# Patient Record
(demographics unavailable — no encounter records)

---

## 2024-11-18 NOTE — ASSESSMENT
[FreeTextEntry1] : PCP: Brendan Pepper MD  Michelle Spaulding is a 76-year-old black female, CHAPIS negative, here today for follow up. 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 4/20/23 Surgical path: Left lumpectomy, SLNbx, IDC, Gr2, T1a (1.2mm), N0/8 Mx, DCIS, Gr3, no EIC, No LCIS, Margins neg for IDC >2mm and DCIS >2 mm. No LVI. Triple NEGATIVE on core. Pt s/p neoadj.  Doing well but seroma on left has reformed, was aspirated on 9/24 due to BR4a mmg on 8/24 and cytology was negative. It feels bulky and pt would like it reaspirated, discussed may reform.  Completed radiation 6/23 Pt finished chemo 4/16 per Dr. Rivera, finished Keytruda. Seeing Dr. Tolentino for breast edema and arm pain, completed PT has f.u with him in December.   History of R lumpectomy and ALND, chemotherapy and radiation in 1997 in [facility] at 48 y/o. Pt unsure of receptors.  10/13/22 [place], L US: *5:00 6cm fn 1.8cm oval-shaped hypoechoic mass with indistinct margins for which US guided bx is rec. *L axillary LN with thickened cortex for which US guided bx is rec. BR4c  11/1/22 [place], L breast and axillary lymph node bx. Path: *L breast 5:00 6cm fn, Gr3, ER 0%, PR0%, Her-2 neg-TRIPLE NEGATIVE, DCIS-Gr 2-3. No LVI. *L axillary LN yielded fragments of LN, negative for carcinoma. Rec surgical or oncological management. Results are Concordant.  11/18/2022 [place], MRI: R BREAST: R breast lumpectomy/posttreatment change. There is no susp enhancement in the R breast. LEFT BREAST: 15 x 20 x 10 mm (TR x AP x CC) mid posterior/N9.6 LOQ/4:00 LEFT breast mass (w/central biopsy tissue marker artifact, >2.5 cm anterior to chest wall and 2.5 cm deep to lateral skin surface) , with adjacent lateral post biopsy change/15 mm hematoma. Adjacent linear NME extends approx 2 cm anteriorly. No other suspicious LEFT breast enhancement. AXILLA/OTHER: There is no significant axillary or internal mammary lymphadenopathy. IMPRESSION: 15 x 20 x 10 mm (TR x AP x CC) mid posterior LOQ LEFT breast mass w/ NME extending approx 2 cm anteriorly. Wide excision recommended. REC: Surgical or oncologic management. BR6  03/23/2023 [place], MRI: R BREAST: Postlumpectomy changes. No suspicious findings. LEFT BREAST: Bx marker in inf with partial resolution of the previously noted adjacent enhancement. There is 2.4 cm linear NME anterior bx marker and 6 mm of NME posterior to the biopsy marker 2.5 cm from chest wall. AXILLA/OTHER: Left axillary lymph nodes with increased prominence compared to 11/18/2022. Right negative. Partially imaged right chest Mediport catheter tip is in the SVC. IMPRESSION: Partial resolution of NME adjacent to biopsy marker in the area of known malignancy in the left inferior breast, corresponding with partial response to neoadjuvant chemotherapy. Residual linear NME measures approximately 3 cm. L axillary LNs with increased prominence as compared to prior exam 11/18/2022. Rec further eval with targeted US and possible bx. REC: US biopsy. BR4B  10/9/23 [facility] dMMG and US: Extremely dense. No susp mass, microcals or other sign of malignancy is identified. There are stable postsurgical changes right breast. There is new infusion port which projects over the right axilla. There are new postsurgical changes posterior central/6:00 left breast. There is diffuse skin thickening left breast status post radiation therapy. There are new postsurgical changes left axilla. US: R- No suspicious solid mass. There is postsurgical scar site 12:00. L- No suspicious solid mass. There is diffuse left breast skin thickening. 12:00 RA 0.6 x 0.3 x 0.2 cm oval-shaped circumscribed hypoechoic mass stable. 5:00 6cmfn postsurgical scar site with large seroma measuring 3.7 x 1.3 x 5.4 cm. Impression: no mmg or US evidence of malignancy. Rec: mmg in 1yr. BR2  10/9/23 PET/CT: Interval metabolic reduction noted in previous left breast lesion and left axillary LNs. Interval development of diffuse skin thickening of this breast, likely inflammatory change. No recurrence in contralateral breast. No convincing evidence of robson or distant metastasis.  1/23/24 PET/CT: Postsurgical changes L breast without significant activity. Mild nonfocal activity along left breast skin thickening. Continue f/u is rec Pt is s/p R breast lumpectomy with no abnormal activity in remaining breast. Bilat axilla negative. Increase in size of density in upper pole of R kidney. Correlate with dedicated contrast CT or MRI for further characterization.  8/29/24 NFR, bilat dMMG and L limited US: Het dense. 4 x 5 cm circumscribed posterior retroareolar/lumpectomy bed mass with adjacent surgical clips, developed from 10/9/2023. Radiation treatment changes, unchanged. No suspicious microcalcifications, or other sign of malignancy is identified. L limited US: Circumscribed 5.6 x 3 x 5.4 cm complex seroma or avascular mass corresponds to mammographic/palpable abnormality in the 5:00 N6 LEFT breast. Impression: 5.6 x 3 x 5.4 cm LEFT lumpectomy bed complex seroma or avascular mass is developed from 10/9/2023. Rec: Cyst aspiration. If determined to be solid at time of aspiration, core biopsy can be performed. BR4A.   9/5/24 [place], L 5:00 6N cyst aspiration: Negative for malignant cells. Impression: benign and concordant. Rec clinical f/u.    FHx: Breast cancer: MAunt 1/2  CBE: Right Well healed prior axillary scar and UIQ scar. Left inframammary inc and Axillary inc healed well with post radiation and postop changes. Right Well healed prior axillary scar and UIQ scar.  Pt feels the seroma and aspiration discussed. Discussed may reform, she understands but would like aspiration. See procedure note.  Pt felt relief after aspiration.  F/u with Dr. Rivera and Dr. Tolentino as scheduled. Reviewed with patient her exercise regimen. She participated in the exercise programs, [email or web address].   PLAN: Pt is 1.5 years out. ARELI.  F.u with Dr. Rivera and Patric as scheduled. Cytology from today's seroma aspiration sent.  Left u/s and mmg, 2/25 and then f.u.

## 2024-11-18 NOTE — DATA REVIEWED
[FreeTextEntry1] : 8/29/24 NFR, bilat dMMG and L limited US: Het dense. 4 x 5 cm circumscribed posterior retroareolar/lumpectomy bed mass with adjacent surgical clips, developed from 10/9/2023. Radiation treatment changes, unchanged. No suspicious microcalcifications, or other sign of malignancy is identified. L limited US: Circumscribed 5.6 x 3 x 5.4 cm complex seroma or avascular mass corresponds to mammographic/palpable abnormality in the 5:00 N6 LEFT breast. Impression: 5.6 x 3 x 5.4 cm LEFT lumpectomy bed complex seroma or avascular mass is developed from 10/9/2023. Rec: Cyst aspiration. If determined to be solid at time of aspiration, core biopsy can be performed. BR4A.   9/5/24 [place], L 5:00 6N cyst aspiration: Negative for malignant cells. Impression: benign and concordant. Rec clinical f/u.

## 2024-11-18 NOTE — PHYSICAL EXAM
[Normocephalic] : normocephalic [Atraumatic] : atraumatic [Supple] : supple [No Supraclavicular Adenopathy] : no supraclavicular adenopathy [No Thyromegaly] : no thyromegaly [Examined in the supine and seated position] : examined in the supine and seated position [No dominant masses] : no dominant masses in right breast  [No dominant masses] : no dominant masses left breast [No Nipple Retraction] : no left nipple retraction [No Nipple Discharge] : no left nipple discharge [No Axillary Lymphadenopathy] : no left axillary lymphadenopathy [No Edema] : no edema [No Swelling] : no swelling [Full ROM] : full range of motion [No Rashes] : no rashes [No Ulceration] : no ulceration [de-identified] :  Right Well healed prior axillary scar and UIQ scar. Left inframammary inc and Axillary inc healed well with post radiation and postop changes. Right Well healed prior axillary scar and UIQ scar. [TextEntry] : Psych: Appropriate, NAD, A&Ox3 Neuro: Intact grossly, gait normal

## 2024-11-18 NOTE — HISTORY OF PRESENT ILLNESS
[FreeTextEntry1] : PCP: Brendan Pepper MD  Michelle Spaulding is a 76-year-old black female, CHAPIS negative, here today for follow up. 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 4/20/23 Surgical path: Left lumpectomy, SLNbx, IDC, Gr2, T1a (1.2mm), N0/8 Mx, DCIS, Gr3, no EIC, No LCIS, Margins neg for IDC >2mm and DCIS >2 mm. No LVI. Triple NEGATIVE on core. Pt s/p neoadj.  Doing well but seroma on left has reformed, was aspirated on 9/24 due to BR4a mmg on 8/24 and cytology was negative. It feels bulky and pt would like it reaspirated, discussed may reform.  Completed radiation 6/23 Pt finished chemo 4/16 per Dr. Rivear, finished Keytruda. Seeing Dr. Tolentino for breast edema and arm pain, completed PT has f.u with him in December.   History of R lumpectomy and ALND, chemotherapy and radiation in 1997 in [facility] at 48 y/o. Pt unsure of receptors.  10/13/22 [place], L US: *5:00 6cm fn 1.8cm oval-shaped hypoechoic mass with indistinct margins for which US guided bx is rec. *L axillary LN with thickened cortex for which US guided bx is rec. BR4c  11/1/22 [place], L breast and axillary lymph node bx. Path: *L breast 5:00 6cm fn, Gr3, ER 0%, PR0%, Her-2 neg-TRIPLE NEGATIVE, DCIS-Gr 2-3. No LVI. *L axillary LN yielded fragments of LN, negative for carcinoma. Rec surgical or oncological management. Results are Concordant.  11/18/2022 [place], MRI: R BREAST: R breast lumpectomy/posttreatment change. There is no susp enhancement in the R breast. LEFT BREAST: 15 x 20 x 10 mm (TR x AP x CC) mid posterior/N9.6 LOQ/4:00 LEFT breast mass (w/central biopsy tissue marker artifact, >2.5 cm anterior to chest wall and 2.5 cm deep to lateral skin surface) , with adjacent lateral post biopsy change/15 mm hematoma. Adjacent linear NME extends approx 2 cm anteriorly. No other suspicious LEFT breast enhancement. AXILLA/OTHER: There is no significant axillary or internal mammary lymphadenopathy. IMPRESSION: 15 x 20 x 10 mm (TR x AP x CC) mid posterior LOQ LEFT breast mass w/ NME extending approx 2 cm anteriorly. Wide excision recommended. REC: Surgical or oncologic management. BR6  03/23/2023 [place], MRI: R BREAST: Postlumpectomy changes. No suspicious findings. LEFT BREAST: Bx marker in inf with partial resolution of the previously noted adjacent enhancement. There is 2.4 cm linear NME anterior bx marker and 6 mm of NME posterior to the biopsy marker 2.5 cm from chest wall. AXILLA/OTHER: Left axillary lymph nodes with increased prominence compared to 11/18/2022. Right negative. Partially imaged right chest Mediport catheter tip is in the SVC. IMPRESSION: Partial resolution of NME adjacent to biopsy marker in the area of known malignancy in the left inferior breast, corresponding with partial response to neoadjuvant chemotherapy. Residual linear NME measures approximately 3 cm. L axillary LNs with increased prominence as compared to prior exam 11/18/2022. Rec further eval with targeted US and possible bx. REC: US biopsy. BR4B  10/9/23 [facility] dMMG and US: Extremely dense. No susp mass, microcals or other sign of malignancy is identified. There are stable postsurgical changes right breast. There is new infusion port which projects over the right axilla. There are new postsurgical changes posterior central/6:00 left breast. There is diffuse skin thickening left breast status post radiation therapy. There are new postsurgical changes left axilla. US: R- No suspicious solid mass. There is postsurgical scar site 12:00. L- No suspicious solid mass. There is diffuse left breast skin thickening. 12:00 RA 0.6 x 0.3 x 0.2 cm oval-shaped circumscribed hypoechoic mass stable. 5:00 6cmfn postsurgical scar site with large seroma measuring 3.7 x 1.3 x 5.4 cm. Impression: no mmg or US evidence of malignancy. Rec: mmg in 1yr. BR2  10/9/23 PET/CT: Interval metabolic reduction noted in previous left breast lesion and left axillary LNs. Interval development of diffuse skin thickening of this breast, likely inflammatory change. No recurrence in contralateral breast. No convincing evidence of robson or distant metastasis.  1/23/24 PET/CT: Postsurgical changes L breast without significant activity. Mild nonfocal activity along left breast skin thickening. Continue f/u is rec Pt is s/p R breast lumpectomy with no abnormal activity in remaining breast. Bilat axilla negative. Increase in size of density in upper pole of R kidney. Correlate with dedicated contrast CT or MRI for further characterization.  8/29/24 NFR, bilat dMMG and L limited US: Het dense. 4 x 5 cm circumscribed posterior retroareolar/lumpectomy bed mass with adjacent surgical clips, developed from 10/9/2023. Radiation treatment changes, unchanged. No suspicious microcalcifications, or other sign of malignancy is identified. L limited US: Circumscribed 5.6 x 3 x 5.4 cm complex seroma or avascular mass corresponds to mammographic/palpable abnormality in the 5:00 N6 LEFT breast. Impression: 5.6 x 3 x 5.4 cm LEFT lumpectomy bed complex seroma or avascular mass is developed from 10/9/2023. Rec: Cyst aspiration. If determined to be solid at time of aspiration, core biopsy can be performed. BR4A.   9/5/24 [place], L 5:00 6N cyst aspiration: Negative for malignant cells. Impression: benign and concordant. Rec clinical f/u.    FHx: Breast cancer: MAunt 1/2

## 2024-11-18 NOTE — DATA REVIEWED
[FreeTextEntry1] : 8/29/24 NFR, bilat dMMG and L limited US: Het dense. 4 x 5 cm circumscribed posterior retroareolar/lumpectomy bed mass with adjacent surgical clips, developed from 10/9/2023. Radiation treatment changes, unchanged. No suspicious microcalcifications, or other sign of malignancy is identified. L limited US: Circumscribed 5.6 x 3 x 5.4 cm complex seroma or avascular mass corresponds to mammographic/palpable abnormality in the 5:00 N6 LEFT breast. Impression: 5.6 x 3 x 5.4 cm LEFT lumpectomy bed complex seroma or avascular mass is developed from 10/9/2023. Rec: Cyst aspiration. If determined to be solid at time of aspiration, core biopsy can be performed. BR4A.   9/5/24 [place], L 5:00 6N cyst aspiration: Negative for malignant cells. Impression: benign and concordant. Rec clinical f/u.

## 2024-11-18 NOTE — HISTORY OF PRESENT ILLNESS
[FreeTextEntry1] : PCP: Brendan Pepper MD  Michelle Spaulding is a 76-year-old black female, CHAPIS negative, here today for follow up. 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 4/20/23 Surgical path: Left lumpectomy, SLNbx, IDC, Gr2, T1a (1.2mm), N0/8 Mx, DCIS, Gr3, no EIC, No LCIS, Margins neg for IDC >2mm and DCIS >2 mm. No LVI. Triple NEGATIVE on core. Pt s/p neoadj.  Doing well but seroma on left has reformed, was aspirated on 9/24 due to BR4a mmg on 8/24 and cytology was negative. It feels bulky and pt would like it reaspirated, discussed may reform.  Completed radiation 6/23 Pt finished chemo 4/16 per Dr. Rivera, finished Keytruda. Seeing Dr. Tolentino for breast edema and arm pain, completed PT has f.u with him in December.   History of R lumpectomy and ALND, chemotherapy and radiation in 1997 in [facility] at 50 y/o. Pt unsure of receptors.  10/13/22 [place], L US: *5:00 6cm fn 1.8cm oval-shaped hypoechoic mass with indistinct margins for which US guided bx is rec. *L axillary LN with thickened cortex for which US guided bx is rec. BR4c  11/1/22 [place], L breast and axillary lymph node bx. Path: *L breast 5:00 6cm fn, Gr3, ER 0%, PR0%, Her-2 neg-TRIPLE NEGATIVE, DCIS-Gr 2-3. No LVI. *L axillary LN yielded fragments of LN, negative for carcinoma. Rec surgical or oncological management. Results are Concordant.  11/18/2022 [place], MRI: R BREAST: R breast lumpectomy/posttreatment change. There is no susp enhancement in the R breast. LEFT BREAST: 15 x 20 x 10 mm (TR x AP x CC) mid posterior/N9.6 LOQ/4:00 LEFT breast mass (w/central biopsy tissue marker artifact, >2.5 cm anterior to chest wall and 2.5 cm deep to lateral skin surface) , with adjacent lateral post biopsy change/15 mm hematoma. Adjacent linear NME extends approx 2 cm anteriorly. No other suspicious LEFT breast enhancement. AXILLA/OTHER: There is no significant axillary or internal mammary lymphadenopathy. IMPRESSION: 15 x 20 x 10 mm (TR x AP x CC) mid posterior LOQ LEFT breast mass w/ NME extending approx 2 cm anteriorly. Wide excision recommended. REC: Surgical or oncologic management. BR6  03/23/2023 [place], MRI: R BREAST: Postlumpectomy changes. No suspicious findings. LEFT BREAST: Bx marker in inf with partial resolution of the previously noted adjacent enhancement. There is 2.4 cm linear NME anterior bx marker and 6 mm of NME posterior to the biopsy marker 2.5 cm from chest wall. AXILLA/OTHER: Left axillary lymph nodes with increased prominence compared to 11/18/2022. Right negative. Partially imaged right chest Mediport catheter tip is in the SVC. IMPRESSION: Partial resolution of NME adjacent to biopsy marker in the area of known malignancy in the left inferior breast, corresponding with partial response to neoadjuvant chemotherapy. Residual linear NME measures approximately 3 cm. L axillary LNs with increased prominence as compared to prior exam 11/18/2022. Rec further eval with targeted US and possible bx. REC: US biopsy. BR4B  10/9/23 [facility] dMMG and US: Extremely dense. No susp mass, microcals or other sign of malignancy is identified. There are stable postsurgical changes right breast. There is new infusion port which projects over the right axilla. There are new postsurgical changes posterior central/6:00 left breast. There is diffuse skin thickening left breast status post radiation therapy. There are new postsurgical changes left axilla. US: R- No suspicious solid mass. There is postsurgical scar site 12:00. L- No suspicious solid mass. There is diffuse left breast skin thickening. 12:00 RA 0.6 x 0.3 x 0.2 cm oval-shaped circumscribed hypoechoic mass stable. 5:00 6cmfn postsurgical scar site with large seroma measuring 3.7 x 1.3 x 5.4 cm. Impression: no mmg or US evidence of malignancy. Rec: mmg in 1yr. BR2  10/9/23 PET/CT: Interval metabolic reduction noted in previous left breast lesion and left axillary LNs. Interval development of diffuse skin thickening of this breast, likely inflammatory change. No recurrence in contralateral breast. No convincing evidence of robson or distant metastasis.  1/23/24 PET/CT: Postsurgical changes L breast without significant activity. Mild nonfocal activity along left breast skin thickening. Continue f/u is rec Pt is s/p R breast lumpectomy with no abnormal activity in remaining breast. Bilat axilla negative. Increase in size of density in upper pole of R kidney. Correlate with dedicated contrast CT or MRI for further characterization.  8/29/24 NFR, bilat dMMG and L limited US: Het dense. 4 x 5 cm circumscribed posterior retroareolar/lumpectomy bed mass with adjacent surgical clips, developed from 10/9/2023. Radiation treatment changes, unchanged. No suspicious microcalcifications, or other sign of malignancy is identified. L limited US: Circumscribed 5.6 x 3 x 5.4 cm complex seroma or avascular mass corresponds to mammographic/palpable abnormality in the 5:00 N6 LEFT breast. Impression: 5.6 x 3 x 5.4 cm LEFT lumpectomy bed complex seroma or avascular mass is developed from 10/9/2023. Rec: Cyst aspiration. If determined to be solid at time of aspiration, core biopsy can be performed. BR4A.   9/5/24 [place], L 5:00 6N cyst aspiration: Negative for malignant cells. Impression: benign and concordant. Rec clinical f/u.    FHx: Breast cancer: MAunt 1/2

## 2024-11-18 NOTE — CONSULT LETTER
[Dear  ___] : Dear  [unfilled], [Courtesy Letter:] : I had the pleasure of seeing your patient, [unfilled], in my office today. [Please see my note below.] : Please see my note below. [Consult Closing:] : Thank you very much for allowing me to participate in the care of this patient.  If you have any questions, please do not hesitate to contact me. [Sincerely,] : Sincerely, [DrNely  ___] : Dr. SIFUENTES [FreeTextEntry3] : Alva Martinez MD

## 2024-11-18 NOTE — ASSESSMENT
[FreeTextEntry1] : PCP: Brendan Pepper MD  Michelle Spaulding is a 76-year-old black female, CHAPIS negative, here today for follow up. 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 4/20/23 Surgical path: Left lumpectomy, SLNbx, IDC, Gr2, T1a (1.2mm), N0/8 Mx, DCIS, Gr3, no EIC, No LCIS, Margins neg for IDC >2mm and DCIS >2 mm. No LVI. Triple NEGATIVE on core. Pt s/p neoadj.  Doing well but seroma on left has reformed, was aspirated on 9/24 due to BR4a mmg on 8/24 and cytology was negative. It feels bulky and pt would like it reaspirated, discussed may reform.  Completed radiation 6/23 Pt finished chemo 4/16 per Dr. Rivera, finished Keytruda. Seeing Dr. Tolentino for breast edema and arm pain, completed PT has f.u with him in December.   History of R lumpectomy and ALND, chemotherapy and radiation in 1997 in [facility] at 50 y/o. Pt unsure of receptors.  10/13/22 [place], L US: *5:00 6cm fn 1.8cm oval-shaped hypoechoic mass with indistinct margins for which US guided bx is rec. *L axillary LN with thickened cortex for which US guided bx is rec. BR4c  11/1/22 [place], L breast and axillary lymph node bx. Path: *L breast 5:00 6cm fn, Gr3, ER 0%, PR0%, Her-2 neg-TRIPLE NEGATIVE, DCIS-Gr 2-3. No LVI. *L axillary LN yielded fragments of LN, negative for carcinoma. Rec surgical or oncological management. Results are Concordant.  11/18/2022 [place], MRI: R BREAST: R breast lumpectomy/posttreatment change. There is no susp enhancement in the R breast. LEFT BREAST: 15 x 20 x 10 mm (TR x AP x CC) mid posterior/N9.6 LOQ/4:00 LEFT breast mass (w/central biopsy tissue marker artifact, >2.5 cm anterior to chest wall and 2.5 cm deep to lateral skin surface) , with adjacent lateral post biopsy change/15 mm hematoma. Adjacent linear NME extends approx 2 cm anteriorly. No other suspicious LEFT breast enhancement. AXILLA/OTHER: There is no significant axillary or internal mammary lymphadenopathy. IMPRESSION: 15 x 20 x 10 mm (TR x AP x CC) mid posterior LOQ LEFT breast mass w/ NME extending approx 2 cm anteriorly. Wide excision recommended. REC: Surgical or oncologic management. BR6  03/23/2023 [place], MRI: R BREAST: Postlumpectomy changes. No suspicious findings. LEFT BREAST: Bx marker in inf with partial resolution of the previously noted adjacent enhancement. There is 2.4 cm linear NME anterior bx marker and 6 mm of NME posterior to the biopsy marker 2.5 cm from chest wall. AXILLA/OTHER: Left axillary lymph nodes with increased prominence compared to 11/18/2022. Right negative. Partially imaged right chest Mediport catheter tip is in the SVC. IMPRESSION: Partial resolution of NME adjacent to biopsy marker in the area of known malignancy in the left inferior breast, corresponding with partial response to neoadjuvant chemotherapy. Residual linear NME measures approximately 3 cm. L axillary LNs with increased prominence as compared to prior exam 11/18/2022. Rec further eval with targeted US and possible bx. REC: US biopsy. BR4B  10/9/23 [place] dMMG and US: Extremely dense. No susp mass, microcals or other sign of malignancy is identified. There are stable postsurgical changes right breast. There is new infusion port which projects over the right axilla. There are new postsurgical changes posterior central/6:00 left breast. There is diffuse skin thickening left breast status post radiation therapy. There are new postsurgical changes left axilla. US: R- No suspicious solid mass. There is postsurgical scar site 12:00. L- No suspicious solid mass. There is diffuse left breast skin thickening. 12:00 RA 0.6 x 0.3 x 0.2 cm oval-shaped circumscribed hypoechoic mass stable. 5:00 6cmfn postsurgical scar site with large seroma measuring 3.7 x 1.3 x 5.4 cm. Impression: no mmg or US evidence of malignancy. Rec: mmg in 1yr. BR2  10/9/23 PET/CT: Interval metabolic reduction noted in previous left breast lesion and left axillary LNs. Interval development of diffuse skin thickening of this breast, likely inflammatory change. No recurrence in contralateral breast. No convincing evidence of robson or distant metastasis.  1/23/24 PET/CT: Postsurgical changes L breast without significant activity. Mild nonfocal activity along left breast skin thickening. Continue f/u is rec Pt is s/p R breast lumpectomy with no abnormal activity in remaining breast. Bilat axilla negative. Increase in size of density in upper pole of R kidney. Correlate with dedicated contrast CT or MRI for further characterization.  8/29/24 NFR, bilat dMMG and L limited US: Het dense. 4 x 5 cm circumscribed posterior retroareolar/lumpectomy bed mass with adjacent surgical clips, developed from 10/9/2023. Radiation treatment changes, unchanged. No suspicious microcalcifications, or other sign of malignancy is identified. L limited US: Circumscribed 5.6 x 3 x 5.4 cm complex seroma or avascular mass corresponds to mammographic/palpable abnormality in the 5:00 N6 LEFT breast. Impression: 5.6 x 3 x 5.4 cm LEFT lumpectomy bed complex seroma or avascular mass is developed from 10/9/2023. Rec: Cyst aspiration. If determined to be solid at time of aspiration, core biopsy can be performed. BR4A.   9/5/24 [place], L 5:00 6N cyst aspiration: Negative for malignant cells. Impression: benign and concordant. Rec clinical f/u.    FHx: Breast cancer: MAunt 1/2  CBE: Right Well healed prior axillary scar and UIQ scar. Left inframammary inc and Axillary inc healed well with post radiation and postop changes. Right Well healed prior axillary scar and UIQ scar.  Pt feels the seroma and aspiration discussed. Discussed may reform, she understands but would like aspiration. See procedure note.  Pt felt relief after aspiration.  F/u with Dr. Rivera and Dr. Tolentino as scheduled. Reviewed with patient her exercise regimen. She participated in the exercise programs, [email or web address].   PLAN: Pt is 1.5 years out. ARELI.  F.u with Dr. Rivera and Patric as scheduled. Cytology from today's seroma aspiration sent.  Left u/s and mmg, 2/25 and then f.u.

## 2024-11-18 NOTE — PROCEDURE
[FreeTextEntry1] : left seroma aspiration [FreeTextEntry2] : symptomatic seroma [FreeTextEntry3] : Procedure for aspiration was discussed with patient and consent was signed.  The L breast was prepped with alcohol.  A 21 gauge needle used for aspiration in seroma and 5 ml of dark clear fluid aspirated and sent for evaluation for cytology.  The patient tolerated the procedure well and hemostasis was excellent. A bandage was placed over the site.  No evidence of infection.

## 2024-11-18 NOTE — PAST MEDICAL HISTORY
[Menarche Age ____] : age at menarche was [unfilled] [Menopause Age____] : age at menopause was [unfilled] [Total Preg ___] : G[unfilled] [Live Births ___] : P[unfilled]  [Living ___] : Living: [unfilled] [Age At Live Birth ___] : Age at live birth: [unfilled]

## 2024-11-18 NOTE — PHYSICAL EXAM
[Normocephalic] : normocephalic [Atraumatic] : atraumatic [Supple] : supple [No Supraclavicular Adenopathy] : no supraclavicular adenopathy [No Thyromegaly] : no thyromegaly [Examined in the supine and seated position] : examined in the supine and seated position [No dominant masses] : no dominant masses in right breast  [No dominant masses] : no dominant masses left breast [No Nipple Retraction] : no left nipple retraction [No Nipple Discharge] : no left nipple discharge [No Axillary Lymphadenopathy] : no left axillary lymphadenopathy [No Edema] : no edema [No Swelling] : no swelling [Full ROM] : full range of motion [No Rashes] : no rashes [No Ulceration] : no ulceration [de-identified] :  Right Well healed prior axillary scar and UIQ scar. Left inframammary inc and Axillary inc healed well with post radiation and postop changes. Right Well healed prior axillary scar and UIQ scar. [TextEntry] : Psych: Appropriate, NAD, A&Ox3 Neuro: Intact grossly, gait normal

## 2024-12-17 NOTE — PHYSICAL EXAM
[FreeTextEntry1] : Gen: Patient is A&O x 3, NAD HEENT: EOMI, hearing grossly normal Resp: regular, non - labored CV: pulses regular Skin: no rashes, erythema Lymph: no clubbing, cyanosis, edema in UE, + subtle edema/fibrosis in left breast  Inspection: no instability  ROM: FROM left shoulder abduction  Palpation: No TTP Sensation: decreased to LT in b/l feet  Reflexes: 1+ and symmetric throughout Strength: 5/5 throughout Special tests: -Left allen sign, -Empty can sign, -speeds sign   Gait: normal, non-antalgic  Past Measurements upper extremities:  Left: 15 cm above the elbow: 29 cm 28 cm 28 cm 10 cm above the elbow: 28 cm 27.5 cm 27.5 cm 10 cm below the elbow: 23.5 cm 23 cm 23 cm 15 cm below the elbow: 20.5 cm 20.5 cm 19.5 cm wrist (at the ulnar styloid): 16 cm 16 cm 16 cm Hand: 18.5 cm 18.5 cm 18.5 cm base of 2nd digit: 6 cm 6 cm 6 cm   Right: 15 cm above the elbow: 28 cm 28 cm 27.5 cm  10 cm above the elbow: 27.5 cm 27.5 cm 27 cm 10 cm below the elbow: 23 cm 23 cm 23.5 cm 15 cm below the elbow: 19.5 cm 20.5 cm 20.5 cm  wrist (at the ulnar styloid): 16 cm 16 cm 16.5 cm Hand: 19 cm 19.5 cm 20 cm base of 2nd digit: 6 cm 6.5 cm 6 cm

## 2024-12-17 NOTE — HISTORY OF PRESENT ILLNESS
[FreeTextEntry1] : Ms. 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  4/20/23 Surgical path: Left lumpectomy, SLNbx, IDC, Gr2, T1a (1.2mm), N0/8 Mx, DCIS, Gr3, no EIC, No LCIS, Margins neg for IDC >2mm and DCIS >2 mm. No LVI. Triple NEGATIVE on core. Pt s/p neoadj.  On Keytruda History of Right lumpectomy and ALND, chemotherapy and radiation in 1997 in [facility] at 48 y/o.  Since her last visit she states she is doing well.  Denies any shoulder pain.  Denies any swelling or heaviness in upper extremities.  She reports that she had her seroma aspirated.  Reports that it did improve but feels like she still has swelling in her left breast region.  No erythema increased warmth.

## 2024-12-17 NOTE — ASSESSMENT
[FreeTextEntry1] : 75 year old female presenting for evaluation.  #Post-mastectomy pain syndrome: -Likely secondary to bursitis  -Improving -S/P MRI -Continue home exercise program  -voltaren gel prn  -Monitor  -Continue aerobic exercise and low resistance strength training with [employer]   #Left breast edema: -s/p seroma aspiration, reviewed breast surgery records and pathology  -Early fibrosis/lymphedema -Continue self MLD -Continue chip pad/compression bra -Monitor  #At risk for lymphedema in b/l UE: -No clinical signs of lymphedema on exam, patient without subjective symptoms  -Lymphedema education provided to patient -Defer bioimpedance spectroscopy given b/l risk  -Continue to Monitor    Follow up in 4 months.

## 2025-02-24 NOTE — ASSESSMENT
[FreeTextEntry1] : PCP: Brendan Pepper MD  Michelle Spaulding is a 76-year-old black female, CHAPIS negative, here today for follow up after recent studies. 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 4/20/23 Surgical path: Left lumpectomy, SLNbx, IDC, Gr2, T1a (1.2mm), N0/8 Mx, DCIS, Gr3, no EIC, No LCIS, Margins neg for IDC >2mm and DCIS >2 mm. No LVI. Triple NEGATIVE on core. Pt s/p neoadj.  Left breast seroma aspirated last visit, 11/18 and cytology was negative. Previously aspirated also on 9/24 due to BR4a mmg on 8/24 and cytology was negative. At present, pt reports seroma has returned, desires reaspiration.  Completed radiation 6/23 with Dr. Spivey.  Pt finished chemo 4/16 per Dr. Rivera, finished Keytruda. PET/CT 2024 with seroma, per Dr. Rivera's last note in December. Seeing Dr. Tolentino for breast edema and arm pain, completed PT and rec to use volatern gel. She saw him in December.   History of R lumpectomy and ALND, chemotherapy and radiation in 1997 in [facility] at 50 y/o. Pt unsure of receptors. Hx of provoked PE (4 days post Covid 19 vaccine #2 Pfizer,) on Xarelto since 2022.  11/1/22 [place], L breast and axillary lymph node bx. Path: *L breast 5:00 6cm fn, Gr3, ER 0%, PR0%, Her-2 neg-TRIPLE NEGATIVE, DCIS-Gr 2-3. No LVI. *L axillary LN yielded fragments of LN, negative for carcinoma. Rec surgical or oncological management. Results are Concordant.  1/23/24 PET/CT: Postsurgical changes L breast without significant activity. Mild nonfocal activity along left breast skin thickening. Continue f/u is rec Pt is s/p R breast lumpectomy with no abnormal activity in remaining breast. Bilat axilla negative. Increase in size of density in upper pole of R kidney. Correlate with dedicated contrast CT or MRI for further characterization.  8/29/24 NFR, bilat dMMG and L limited US: Het dense. 4 x 5 cm circumscribed posterior retroareolar/lumpectomy bed mass with adjacent surgical clips, developed from 10/9/2023. Radiation treatment changes, unchanged. No suspicious microcalcifications, or other sign of malignancy is identified. L limited US: Circumscribed 5.6 x 3 x 5.4 cm complex seroma or avascular mass corresponds to mammographic/palpable abnormality in the 5:00 N6 LEFT breast. Impression: 5.6 x 3 x 5.4 cm LEFT lumpectomy bed complex seroma or avascular mass is developed from 10/9/2023. Rec: Cyst aspiration. If determined to be solid at time of aspiration, core biopsy can be performed. BR4A.  9/5/24 [place], L 5:00 6N cyst aspiration: Negative for malignant cells. Impression: benign and concordant. Rec clinical f/u.  2/4/2025 [place] L dMMG and limited US: The breasts are HG dense. No suspicious mass, microcalcs, or other sign of malignancy is identified. A postsurgical seroma is again identified at the area of postsurgical scar and surgical clips in the *posterior lateral left breast. A few benign-appearing calcifications are again noted. Skin thickening secondary to radiation treatment changes is unchanged. US: Again, identified at *5:00, 6cm FN is a 5.1 x 3.0 x 4.7 cm seroma, which had been previously aspirated with benign results, but has now returned. There is no suspicious solid mass. IMPRESSION: Return of previously aspirated benign seroma. REC: Clinical follow up. Unless otherwise clinically indicated, annual bilateral mammogram would be due in August 2025. BR2.  FHx: Breast cancer: MAunt 1/2  CBE: Right Well healed prior axillary scar and UIQ scar. Left inframammary inc and Axillary inc healed well with post radiation and postop changes.  No axillary or SC lymphadenopathy. No obvious lymphedema.   Pt feels the seroma and aspiration discussed. Discussed may reform, she understands but would like aspiration. See procedure note. Only 3 ml of dark fluid aspirated, could consider aspiration under u/s guidance. Pt declines for now, will wait for results of MRI.   Also given dense breasts and hx of bilat breast cancer, MRI screening. Could do now since bilat is due in 8/25. Pt agrees to get MRI. F/u with Dr. Rivera and Dr. Tolentino as scheduled. No SOZO given bilat.   PLAN: Pt is almost 2.0 years out. ARELI. F.u with Dr. Rivera and Patric as scheduled. MRI breast now. IF negative f.u after mmg.  Possible aspiration of left seroma pending results of MRI.  F.u with Dr. Tolentino as scheduled.  Bilat mmg due 8/30/25

## 2025-02-24 NOTE — PHYSICAL EXAM
[Normocephalic] : normocephalic [Atraumatic] : atraumatic [Supple] : supple [No Supraclavicular Adenopathy] : no supraclavicular adenopathy [No Thyromegaly] : no thyromegaly [Examined in the supine and seated position] : examined in the supine and seated position [Asymmetrical] : asymmetrical [No dominant masses] : no dominant masses in right breast  [No dominant masses] : no dominant masses left breast [No Nipple Retraction] : no left nipple retraction [No Nipple Discharge] : no left nipple discharge [No Axillary Lymphadenopathy] : no left axillary lymphadenopathy [No Edema] : no edema [No Rashes] : no rashes [No Ulceration] : no ulceration [No Swelling] : no swelling [Full ROM] : full range of motion [de-identified] : Right Well healed prior axillary scar and UIQ scar. port on right upper chest (pt to get it removed soon) Left inframammary inc and seroma noted, and Axillary inc healed well with post radiation and postop changes. [TextEntry] : Psych: Appropriate, NAD, A&Ox3 Neuro: Intact grossly, gait normal

## 2025-02-24 NOTE — DATA REVIEWED
[FreeTextEntry1] : 2/4/2025 [place] [facility] dMMG and limited US: The breasts are HG dense. No suspicious mass, microcalcs, or other sign of malignancy is identified. A postsurgical seroma is again identified at the area of postsurgical scar and surgical clips in the *posterior lateral left breast. A few benign-appearing calcifications are again noted. Skin thickening secondary to radiation treatment changes is unchanged. US: Again, identified at *5:00, 6cm FN is a 5.1 x 3.0 x 4.7 cm seroma, which had been previously aspirated with benign results, but has now returned. There is no suspicious solid mass. IMPRESSION: Return of previously aspirated benign seroma. REC: Clinical follow up. Unless otherwise clinically indicated, annual bilateral mammogram would be due in August 2025. BR2.

## 2025-02-24 NOTE — CONSULT LETTER
[Dear  ___] : Dear  [unfilled], [Courtesy Letter:] : I had the pleasure of seeing your patient, [unfilled], in my office today. progress note [Please see my note below.] : Please see my note below. [Sincerely,] : Sincerely, [FreeTextEntry3] : Alva Martinez MD

## 2025-02-24 NOTE — PHYSICAL EXAM
[Normocephalic] : normocephalic [Atraumatic] : atraumatic [Supple] : supple [No Supraclavicular Adenopathy] : no supraclavicular adenopathy [No Thyromegaly] : no thyromegaly [Examined in the supine and seated position] : examined in the supine and seated position [Asymmetrical] : asymmetrical [No dominant masses] : no dominant masses in right breast  [No dominant masses] : no dominant masses left breast [No Nipple Retraction] : no left nipple retraction [No Nipple Discharge] : no left nipple discharge [No Axillary Lymphadenopathy] : no left axillary lymphadenopathy [No Edema] : no edema [No Rashes] : no rashes [No Ulceration] : no ulceration [No Swelling] : no swelling [Full ROM] : full range of motion [de-identified] : Right Well healed prior axillary scar and UIQ scar. port on right upper chest (pt to get it removed soon) Left inframammary inc and seroma noted, and Axillary inc healed well with post radiation and postop changes. [TextEntry] : Psych: Appropriate, NAD, A&Ox3 Neuro: Intact grossly, gait normal

## 2025-02-24 NOTE — HISTORY OF PRESENT ILLNESS
[FreeTextEntry1] : PCP: Brendan Pepper MD  Michelle Spaulding is a 76-year-old black female, CHAPIS negative, here today for follow up after recent studies. 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 4/20/23 Surgical path: Left lumpectomy, SLNbx, IDC, Gr2, T1a (1.2mm), N0/8 Mx, DCIS, Gr3, no EIC, No LCIS, Margins neg for IDC >2mm and DCIS >2 mm. No LVI. Triple NEGATIVE on core. Pt s/p neoadj.  Left breast seroma aspirated last visit, 11/18 and cytology was negative. Previously aspirated also on 9/24 due to BR4a mmg on 8/24 and cytology was negative. At present, pt reports seroma has returned, desires reaspiration.  Completed radiation 6/23 with Dr. Spivey.  Pt finished chemo 4/16 per Dr. Rivera, finished Keytruda. PET/CT 2024 with seroma, per Dr. Rivera's last note in December. Seeing Dr. Tolentino for breast edema and arm pain, completed PT and rec to use volatern gel. She saw him in December.   History of R lumpectomy and ALND, chemotherapy and radiation in 1997 in [facility] at 50 y/o. Pt unsure of receptors. Hx of provoked PE (4 days post Covid 19 vaccine #2 Pfizer,) on Xarelto since 2022.  11/1/22 [place], L breast and axillary lymph node bx. Path: *L breast 5:00 6cm fn, Gr3, ER 0%, PR0%, Her-2 neg-TRIPLE NEGATIVE, DCIS-Gr 2-3. No LVI. *L axillary LN yielded fragments of LN, negative for carcinoma. Rec surgical or oncological management. Results are Concordant.  1/23/24 PET/CT: Postsurgical changes L breast without significant activity. Mild nonfocal activity along left breast skin thickening. Continue f/u is rec Pt is s/p R breast lumpectomy with no abnormal activity in remaining breast. Bilat axilla negative. Increase in size of density in upper pole of R kidney. Correlate with dedicated contrast CT or MRI for further characterization.  8/29/24 NFR, bilat dMMG and L limited US: Het dense. 4 x 5 cm circumscribed posterior retroareolar/lumpectomy bed mass with adjacent surgical clips, developed from 10/9/2023. Radiation treatment changes, unchanged. No suspicious microcalcifications, or other sign of malignancy is identified. L limited US: Circumscribed 5.6 x 3 x 5.4 cm complex seroma or avascular mass corresponds to mammographic/palpable abnormality in the 5:00 N6 LEFT breast. Impression: 5.6 x 3 x 5.4 cm LEFT lumpectomy bed complex seroma or avascular mass is developed from 10/9/2023. Rec: Cyst aspiration. If determined to be solid at time of aspiration, core biopsy can be performed. BR4A.  9/5/24 [place], L 5:00 6N cyst aspiration: Negative for malignant cells. Impression: benign and concordant. Rec clinical f/u.  2/4/2025 [place] L dMMG and limited US: The breasts are HG dense. No suspicious mass, microcalcs, or other sign of malignancy is identified. A postsurgical seroma is again identified at the area of postsurgical scar and surgical clips in the *posterior lateral left breast. A few benign-appearing calcifications are again noted. Skin thickening secondary to radiation treatment changes is unchanged. US: Again, identified at *5:00, 6cm FN is a 5.1 x 3.0 x 4.7 cm seroma, which had been previously aspirated with benign results, but has now returned. There is no suspicious solid mass. IMPRESSION: Return of previously aspirated benign seroma. REC: Clinical follow up. Unless otherwise clinically indicated, annual bilateral mammogram would be due in August 2025. BR2.  FHx: Breast cancer: MAunt 1/2

## 2025-02-24 NOTE — PROCEDURE
Date of Service:  10/25/2024    PCP: Kinsey Clark     ASSESSMENT AND PLAN        1. Lower urinary tract symptoms.  He presents with irritative and obstructive symptoms, including nocturia, weak stream, and hesitancy. The possibility of benign prostatic hyperplasia (BPH) or urethral stricture was discussed. A trial of tamsulosin 0.4 mg daily will be initiated to help improve urinary flow. Potential side effects, such as stuffy nose and headaches, were discussed, and he was advised to stop the medication if these occur.    2. Testicular/groin pain.  The testicles were found to be high riding in the scrotum but could be easily brought down. The scrotum was well-formed and full. No hernia was detected during the physical examination, but he reported groin pain and a history of hernia. A CT scan will be ordered to evaluate for any potential hernia or other abnormalities.    3. Health Maintenance.  A screening PSA test will be ordered to check for prostate cancer, given his age and symptoms.    Follow-up  Return in 6 to 8 weeks for follow-up.       I had a brief counseling session with the patient concerning the diagnosis and treatment options.     I performed a brief review of the patient's medical records.       I reviewed the data with patient, including diagnosis, prognosis, and treatment.    Tests reviewed: None    Tests ordered:  PSA, PVR, Uroflow, and Urinalysis         Matthew Sanford Jr., MD [facility]  Department of Urology  [facility]  [phone]      ______________________________________________________________________________      Chief Complaint:   Weak stream. Groin pain     History of Present Illness:     The patient is a 44-year-old male who presents for evaluation of multiple medical concerns.    He reports that both his testicles are ascending, causing him discomfort for the past 6 to 7 months. He also experiences difficulty in urination, characterized by nocturnal  frequency, hesitancy, and straining. His urinary stream is weak. He denies any history of kidney stones, sexually transmitted diseases, or surgical procedures. An ultrasound performed at the [facility] revealed a cyst on his testicle and an enlarged epididymis. He has not observed any blood in his urine.       Medications:  Current Outpatient Medications   Medication Sig Dispense Refill    tamsulosin (FLOMAX) 0.4 MG Cap Take 1 capsule by mouth daily. 30 capsule 1    amLODIPine (NORVASC) 10 MG tablet Take 1 tablet by mouth daily. 30 tablet 0    cefUROXime (CEFTIN) 500 MG tablet Take 1 tablet by mouth 2 times daily. 20 tablet 0    LOSARTAN POTASSIUM PO Take  by mouth.      Calcium Acetate, Phos Binder, (CALCIUM ACETATE PO) Take  by mouth.       No current facility-administered medications for this visit.       Allergies:  ALLERGIES:   Allergen Reactions    Codeine HIVES       Past Medical History:   Past Medical History:   Diagnosis Date    Drug abuse  (CMD)        Family History:  No family history on file.    Surgical History:  No past surgical history on file.:    Social History:  Social History     Socioeconomic History    Marital status: Significant other     Spouse name: Not on file    Number of children: Not on file    Years of education: Not on file    Highest education level: Not on file   Occupational History    Not on file   Tobacco Use    Smoking status: Every Day     Current packs/day: 1.00     Types: Cigarettes    Smokeless tobacco: Current     Types: Chew    Tobacco comments:     Refused smoking cessation info   Substance and Sexual Activity    Alcohol use: Yes    Drug use: Not Currently    Sexual activity: Not on file   Other Topics Concern    Not on file   Social History Narrative    Not on file     Social Determinants of Health     Financial Resource Strain: Not on file   Food Insecurity: Not on file   Transportation Needs: Not on file   Physical Activity: Not on file   Stress: Not on file   Social  Connections: Unknown (8/25/2024)    Received from [facility]    Social Connections     Social Connections: Last Flowsheet Data: Not on file     Social Connections: Recent Result: Not on file   Interpersonal Safety: Not on file (12/27/2021)        Physical Exam:  Visit Vitals  /65   Pulse 65   Ht 6' 3\" (1.905 m)   BMI 39.32 kg/m²                Constitutional:  Well developed, well nourished, afebrile.  Abdomen:  No masses are palpated.  There is no tenderness.  Liver and spleen appear to be normal.  No abdominal wall herniae noted.   Inguinal:  No abnormalities in either inguinal area.   Genitourinary:    Penis:  The penis appears to be essentially normal and is circumcised.  No external lesions or masses are present.  There are no internal masses  or lesions noted.  There is no tenderness.    Urethral Meatus:  The meatus is normal in size and location.    Scrotum:  No erythema or edema of scrotal skin.  No evidence of scrotal  cysts or rash.  No inguinal hernia noted.    Testicles:  Both testicles are normal to palpation.  No masses or  tenderness noted.  Size normal.    Epididymides/Vas:  Both epididymides and vas deferens are normal to  palpation. No masses or tenderness noted.    e.     In-Office Testing  No results found for this visit on 10/25/24.    PSA:  No results found for: \"PSA\"    Lab Results     Imaging  Ultrasound showed a cyst on the epididymis and enlarged epididymis.       Radiology Results         [FreeTextEntry1] : Aspiration of seroma of left breast [FreeTextEntry2] : symptomatic [FreeTextEntry3] :  Procedure for aspiration was discussed with patient and consent was signed. The L breast was prepped with alcohol. A 21 gauge needle used for aspiration in seroma and 3 ml of dark clear fluid aspirated and discarded. The patient tolerated the procedure well and hemostasis was excellent. A bandage was placed over the site. No evidence of infection.

## 2025-02-24 NOTE — CONSULT LETTER
[Dear  ___] : Dear  [unfilled], [Courtesy Letter:] : I had the pleasure of seeing your patient, [unfilled], in my office today. [Please see my note below.] : Please see my note below. [Sincerely,] : Sincerely, [FreeTextEntry3] : Alva Martinez MD

## 2025-02-24 NOTE — ASSESSMENT
[FreeTextEntry1] : PCP: Brendan Pepper MD  Michelle Spaulding is a 76-year-old black female, CHAPIS negative, here today for follow up after recent studies. 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 4/20/23 Surgical path: Left lumpectomy, SLNbx, IDC, Gr2, T1a (1.2mm), N0/8 Mx, DCIS, Gr3, no EIC, No LCIS, Margins neg for IDC >2mm and DCIS >2 mm. No LVI. Triple NEGATIVE on core. Pt s/p neoadj.  Left breast seroma aspirated last visit, 11/18 and cytology was negative. Previously aspirated also on 9/24 due to BR4a mmg on 8/24 and cytology was negative. At present, pt reports seroma has returned, desires reaspiration.  Completed radiation 6/23 with Dr. Spivey.  Pt finished chemo 4/16 per Dr. Rivera, finished Keytruda. PET/CT 2024 with seroma, per Dr. Rivera's last note in December. Seeing Dr. Tolentino for breast edema and arm pain, completed PT and rec to use volatern gel. She saw him in December.   History of R lumpectomy and ALND, chemotherapy and radiation in 1997 in [facility] at 48 y/o. Pt unsure of receptors. Hx of provoked PE (4 days post Covid 19 vaccine #2 Pfizer,) on Xarelto since 2022.  11/1/22 [place], L breast and axillary lymph node bx. Path: *L breast 5:00 6cm fn, Gr3, ER 0%, PR0%, Her-2 neg-TRIPLE NEGATIVE, DCIS-Gr 2-3. No LVI. *L axillary LN yielded fragments of LN, negative for carcinoma. Rec surgical or oncological management. Results are Concordant.  1/23/24 PET/CT: Postsurgical changes L breast without significant activity. Mild nonfocal activity along left breast skin thickening. Continue f/u is rec Pt is s/p R breast lumpectomy with no abnormal activity in remaining breast. Bilat axilla negative. Increase in size of density in upper pole of R kidney. Correlate with dedicated contrast CT or MRI for further characterization.  8/29/24 NFR, bilat dMMG and L limited US: Het dense. 4 x 5 cm circumscribed posterior retroareolar/lumpectomy bed mass with adjacent surgical clips, developed from 10/9/2023. Radiation treatment changes, unchanged. No suspicious microcalcifications, or other sign of malignancy is identified. L limited US: Circumscribed 5.6 x 3 x 5.4 cm complex seroma or avascular mass corresponds to mammographic/palpable abnormality in the 5:00 N6 LEFT breast. Impression: 5.6 x 3 x 5.4 cm LEFT lumpectomy bed complex seroma or avascular mass is developed from 10/9/2023. Rec: Cyst aspiration. If determined to be solid at time of aspiration, core biopsy can be performed. BR4A.  9/5/24 [place], L 5:00 6N cyst aspiration: Negative for malignant cells. Impression: benign and concordant. Rec clinical f/u.  2/4/2025 [place] L dMMG and limited US: The breasts are HG dense. No suspicious mass, microcalcs, or other sign of malignancy is identified. A postsurgical seroma is again identified at the area of postsurgical scar and surgical clips in the *posterior lateral left breast. A few benign-appearing calcifications are again noted. Skin thickening secondary to radiation treatment changes is unchanged. US: Again, identified at *5:00, 6cm FN is a 5.1 x 3.0 x 4.7 cm seroma, which had been previously aspirated with benign results, but has now returned. There is no suspicious solid mass. IMPRESSION: Return of previously aspirated benign seroma. REC: Clinical follow up. Unless otherwise clinically indicated, annual bilateral mammogram would be due in August 2025. BR2.  FHx: Breast cancer: MAunt 1/2  CBE: Right Well healed prior axillary scar and UIQ scar. Left inframammary inc and Axillary inc healed well with post radiation and postop changes.  No axillary or SC lymphadenopathy. No obvious lymphedema.   Pt feels the seroma and aspiration discussed. Discussed may reform, she understands but would like aspiration. See procedure note. Only 3 ml of dark fluid aspirated, could consider aspiration under u/s guidance. Pt declines for now, will wait for results of MRI.   Also given dense breasts and hx of bilat breast cancer, MRI screening. Could do now since bilat is due in 8/25. Pt agrees to get MRI. F/u with Dr. Rivera and Dr. Tolentino as scheduled. No SOZO given bilat.   PLAN: Pt is almost 2.0 years out. ARELI. F.u with Dr. Rivera and Patric as scheduled. MRI breast now. IF negative f.u after mmg.  Possible aspiration of left seroma pending results of MRI.  F.u with Dr. Tolentino as scheduled.  Bilat mmg due 8/30/25

## 2025-02-24 NOTE — HISTORY OF PRESENT ILLNESS
[FreeTextEntry1] : PCP: Brendan Pepper MD  Michelle Spaulding is a 76-year-old black female, CHAPIS negative, here today for follow up after recent studies. 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 4/20/23 Surgical path: Left lumpectomy, SLNbx, IDC, Gr2, T1a (1.2mm), N0/8 Mx, DCIS, Gr3, no EIC, No LCIS, Margins neg for IDC >2mm and DCIS >2 mm. No LVI. Triple NEGATIVE on core. Pt s/p neoadj.  Left breast seroma aspirated last visit, 11/18 and cytology was negative. Previously aspirated also on 9/24 due to BR4a mmg on 8/24 and cytology was negative. At present, pt reports seroma has returned, desires reaspiration.  Completed radiation 6/23 with Dr. Spivey.  Pt finished chemo 4/16 per Dr. Rivera, finished Keytruda. PET/CT 2024 with seroma, per Dr. Rivera's last note in December. Seeing Dr. Tolentino for breast edema and arm pain, completed PT and rec to use volatern gel. She saw him in December.   History of R lumpectomy and ALND, chemotherapy and radiation in 1997 in [facility] at 50 y/o. Pt unsure of receptors. Hx of provoked PE (4 days post Covid 19 vaccine #2 Pfizer,) on Xarelto since 2022.  11/1/22 [place], L breast and axillary lymph node bx. Path: *L breast 5:00 6cm fn, Gr3, ER 0%, PR0%, Her-2 neg-TRIPLE NEGATIVE, DCIS-Gr 2-3. No LVI. *L axillary LN yielded fragments of LN, negative for carcinoma. Rec surgical or oncological management. Results are Concordant.  1/23/24 PET/CT: Postsurgical changes L breast without significant activity. Mild nonfocal activity along left breast skin thickening. Continue f/u is rec Pt is s/p R breast lumpectomy with no abnormal activity in remaining breast. Bilat axilla negative. Increase in size of density in upper pole of R kidney. Correlate with dedicated contrast CT or MRI for further characterization.  8/29/24 NFR, bilat dMMG and L limited US: Het dense. 4 x 5 cm circumscribed posterior retroareolar/lumpectomy bed mass with adjacent surgical clips, developed from 10/9/2023. Radiation treatment changes, unchanged. No suspicious microcalcifications, or other sign of malignancy is identified. L limited US: Circumscribed 5.6 x 3 x 5.4 cm complex seroma or avascular mass corresponds to mammographic/palpable abnormality in the 5:00 N6 LEFT breast. Impression: 5.6 x 3 x 5.4 cm LEFT lumpectomy bed complex seroma or avascular mass is developed from 10/9/2023. Rec: Cyst aspiration. If determined to be solid at time of aspiration, core biopsy can be performed. BR4A.  9/5/24 [place], L 5:00 6N cyst aspiration: Negative for malignant cells. Impression: benign and concordant. Rec clinical f/u.  2/4/2025 [place] L dMMG and limited US: The breasts are HG dense. No suspicious mass, microcalcs, or other sign of malignancy is identified. A postsurgical seroma is again identified at the area of postsurgical scar and surgical clips in the *posterior lateral left breast. A few benign-appearing calcifications are again noted. Skin thickening secondary to radiation treatment changes is unchanged. US: Again, identified at *5:00, 6cm FN is a 5.1 x 3.0 x 4.7 cm seroma, which had been previously aspirated with benign results, but has now returned. There is no suspicious solid mass. IMPRESSION: Return of previously aspirated benign seroma. REC: Clinical follow up. Unless otherwise clinically indicated, annual bilateral mammogram would be due in August 2025. BR2.  FHx: Breast cancer: MAunt 1/2

## 2025-02-24 NOTE — PHYSICAL EXAM
[Normocephalic] : normocephalic [Atraumatic] : atraumatic [Supple] : supple [No Supraclavicular Adenopathy] : no supraclavicular adenopathy [No Thyromegaly] : no thyromegaly [Examined in the supine and seated position] : examined in the supine and seated position [Asymmetrical] : asymmetrical [No dominant masses] : no dominant masses in right breast  [No dominant masses] : no dominant masses left breast [No Nipple Retraction] : no left nipple retraction [No Nipple Discharge] : no left nipple discharge [No Axillary Lymphadenopathy] : no left axillary lymphadenopathy [No Edema] : no edema [No Rashes] : no rashes [No Ulceration] : no ulceration [No Swelling] : no swelling [Full ROM] : full range of motion [de-identified] : Right Well healed prior axillary scar and UIQ scar. port on right upper chest (pt to get it removed soon) Left inframammary inc and seroma noted, and Axillary inc healed well with post radiation and postop changes. [TextEntry] : Psych: Appropriate, NAD, A&Ox3 Neuro: Intact grossly, gait normal

## 2025-02-24 NOTE — ASSESSMENT
[FreeTextEntry1] : PCP: Brendan Pepper MD  Michelle Spaulding is a 76-year-old black female, CHAPIS negative, here today for follow up after recent studies. 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 4/20/23 Surgical path: Left lumpectomy, SLNbx, IDC, Gr2, T1a (1.2mm), N0/8 Mx, DCIS, Gr3, no EIC, No LCIS, Margins neg for IDC >2mm and DCIS >2 mm. No LVI. Triple NEGATIVE on core. Pt s/p neoadj.  Left breast seroma aspirated last visit, 11/18 and cytology was negative. Previously aspirated also on 9/24 due to BR4a mmg on 8/24 and cytology was negative. At present, pt reports seroma has returned, desires reaspiration.  Completed radiation 6/23 with Dr. Spivey.  Pt finished chemo 4/16 per Dr. Rivera, finished Keytruda. PET/CT 2024 with seroma, per Dr. Rivera's last note in December. Seeing Dr. Tolentino for breast edema and arm pain, completed PT and rec to use volatern gel. She saw him in December.   History of R lumpectomy and ALND, chemotherapy and radiation in 1997 in [facility] at 48 y/o. Pt unsure of receptors. Hx of provoked PE (4 days post Covid 19 vaccine #2 Pfizer,) on Xarelto since 2022.  11/1/22 [place], L breast and axillary lymph node bx. Path: *L breast 5:00 6cm fn, Gr3, ER 0%, PR0%, Her-2 neg-TRIPLE NEGATIVE, DCIS-Gr 2-3. No LVI. *L axillary LN yielded fragments of LN, negative for carcinoma. Rec surgical or oncological management. Results are Concordant.  1/23/24 PET/CT: Postsurgical changes L breast without significant activity. Mild nonfocal activity along left breast skin thickening. Continue f/u is rec Pt is s/p R breast lumpectomy with no abnormal activity in remaining breast. Bilat axilla negative. Increase in size of density in upper pole of R kidney. Correlate with dedicated contrast CT or MRI for further characterization.  8/29/24 NFR, bilat dMMG and L limited US: Het dense. 4 x 5 cm circumscribed posterior retroareolar/lumpectomy bed mass with adjacent surgical clips, developed from 10/9/2023. Radiation treatment changes, unchanged. No suspicious microcalcifications, or other sign of malignancy is identified. L limited US: Circumscribed 5.6 x 3 x 5.4 cm complex seroma or avascular mass corresponds to mammographic/palpable abnormality in the 5:00 N6 LEFT breast. Impression: 5.6 x 3 x 5.4 cm LEFT lumpectomy bed complex seroma or avascular mass is developed from 10/9/2023. Rec: Cyst aspiration. If determined to be solid at time of aspiration, core biopsy can be performed. BR4A.  9/5/24 [place], L 5:00 6N cyst aspiration: Negative for malignant cells. Impression: benign and concordant. Rec clinical f/u.  2/4/2025 [place] L dMMG and limited US: The breasts are HG dense. No suspicious mass, microcalcs, or other sign of malignancy is identified. A postsurgical seroma is again identified at the area of postsurgical scar and surgical clips in the *posterior lateral left breast. A few benign-appearing calcifications are again noted. Skin thickening secondary to radiation treatment changes is unchanged. US: Again, identified at *5:00, 6cm FN is a 5.1 x 3.0 x 4.7 cm seroma, which had been previously aspirated with benign results, but has now returned. There is no suspicious solid mass. IMPRESSION: Return of previously aspirated benign seroma. REC: Clinical follow up. Unless otherwise clinically indicated, annual bilateral mammogram would be due in August 2025. BR2.  FHx: Breast cancer: MAunt 1/2  CBE: Right Well healed prior axillary scar and UIQ scar. Left inframammary inc and Axillary inc healed well with post radiation and postop changes.  No axillary or SC lymphadenopathy. No obvious lymphedema.   Pt feels the seroma and aspiration discussed. Discussed may reform, she understands but would like aspiration. See procedure note. Only 3 ml of dark fluid aspirated, could consider aspiration under u/s guidance. Pt declines for now, will wait for results of MRI.   Also given dense breasts and hx of bilat breast cancer, MRI screening. Could do now since bilat is due in 8/25. Pt agrees to get MRI. F/u with Dr. Rivera and Dr. Tolentino as scheduled. No SOZO given bilat.   PLAN: Pt is almost 2.0 years out. ARELI. F.u with Dr. Rivera and Patric as scheduled. MRI breast now. IF negative f.u after mmg.  Possible aspiration of left seroma pending results of MRI.  F.u with Dr. Tolentino as scheduled.  Bilat mmg due 8/30/25

## 2025-02-24 NOTE — HISTORY OF PRESENT ILLNESS
[FreeTextEntry1] : PCP: Brendan Pepper MD  Michelle Spaulding is a 76-year-old black female, CHAPIS negative, here today for follow up after recent studies. 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 4/20/23 Surgical path: Left lumpectomy, SLNbx, IDC, Gr2, T1a (1.2mm), N0/8 Mx, DCIS, Gr3, no EIC, No LCIS, Margins neg for IDC >2mm and DCIS >2 mm. No LVI. Triple NEGATIVE on core. Pt s/p neoadj.  Left breast seroma aspirated last visit, 11/18 and cytology was negative. Previously aspirated also on 9/24 due to BR4a mmg on 8/24 and cytology was negative. At present, pt reports seroma has returned, desires reaspiration.  Completed radiation 6/23 with Dr. Spivey.  Pt finished chemo 4/16 per Dr. Rivera, finished Keytruda. PET/CT 2024 with seroma, per Dr. Rivera's last note in December. Seeing Dr. Tolentino for breast edema and arm pain, completed PT and rec to use volatern gel. She saw him in December.   History of R lumpectomy and ALND, chemotherapy and radiation in 1997 in [facility] at 50 y/o. Pt unsure of receptors. Hx of provoked PE (4 days post Covid 19 vaccine #2 Pfizer,) on Xarelto since 2022.  11/1/22 [place], L breast and axillary lymph node bx. Path: *L breast 5:00 6cm fn, Gr3, ER 0%, PR0%, Her-2 neg-TRIPLE NEGATIVE, DCIS-Gr 2-3. No LVI. *L axillary LN yielded fragments of LN, negative for carcinoma. Rec surgical or oncological management. Results are Concordant.  1/23/24 PET/CT: Postsurgical changes L breast without significant activity. Mild nonfocal activity along left breast skin thickening. Continue f/u is rec Pt is s/p R breast lumpectomy with no abnormal activity in remaining breast. Bilat axilla negative. Increase in size of density in upper pole of R kidney. Correlate with dedicated contrast CT or MRI for further characterization.  8/29/24 NFR, bilat dMMG and L limited US: Het dense. 4 x 5 cm circumscribed posterior retroareolar/lumpectomy bed mass with adjacent surgical clips, developed from 10/9/2023. Radiation treatment changes, unchanged. No suspicious microcalcifications, or other sign of malignancy is identified. L limited US: Circumscribed 5.6 x 3 x 5.4 cm complex seroma or avascular mass corresponds to mammographic/palpable abnormality in the 5:00 N6 LEFT breast. Impression: 5.6 x 3 x 5.4 cm LEFT lumpectomy bed complex seroma or avascular mass is developed from 10/9/2023. Rec: Cyst aspiration. If determined to be solid at time of aspiration, core biopsy can be performed. BR4A.  9/5/24 [place], L 5:00 6N cyst aspiration: Negative for malignant cells. Impression: benign and concordant. Rec clinical f/u.  2/4/2025 [place] L dMMG and limited US: The breasts are HG dense. No suspicious mass, microcalcs, or other sign of malignancy is identified. A postsurgical seroma is again identified at the area of postsurgical scar and surgical clips in the *posterior lateral left breast. A few benign-appearing calcifications are again noted. Skin thickening secondary to radiation treatment changes is unchanged. US: Again, identified at *5:00, 6cm FN is a 5.1 x 3.0 x 4.7 cm seroma, which had been previously aspirated with benign results, but has now returned. There is no suspicious solid mass. IMPRESSION: Return of previously aspirated benign seroma. REC: Clinical follow up. Unless otherwise clinically indicated, annual bilateral mammogram would be due in August 2025. BR2.  FHx: Breast cancer: MAunt 1/2

## 2025-02-24 NOTE — PROCEDURE
[FreeTextEntry1] : Aspiration of seroma of left breast [FreeTextEntry2] : symptomatic [FreeTextEntry3] :  Procedure for aspiration was discussed with patient and consent was signed. The L breast was prepped with alcohol. A 21 gauge needle used for aspiration in seroma and 3 ml of dark clear fluid aspirated and discarded. The patient tolerated the procedure well and hemostasis was excellent. A bandage was placed over the site. No evidence of infection.

## 2025-04-15 NOTE — PHYSICAL EXAM
[FreeTextEntry1] : Gen: Patient is A&O x 3, NAD HEENT: EOMI, hearing grossly normal Resp: regular, non - labored CV: pulses regular Skin: no rashes, erythema Lymph: no clubbing, cyanosis, edema in UE, + subtle edema/fibrosis in left breast  Inspection: no instability  ROM: FROM left shoulder abduction  Palpation: TTP left periscapular muscles, No TTP thoracic paraspinals  Sensation: decreased to LT in b/l feet  Reflexes: 1+ and symmetric throughout Strength: 5/5 throughout Special tests: -Left allen sign, -Empty can sign, -spurling sign  Gait: normal, non-antalgic  Past Measurements upper extremities:  Left: 15 cm above the elbow: 29 cm 28 cm 28 cm 10 cm above the elbow: 28 cm 27.5 cm 27.5 cm 10 cm below the elbow: 23.5 cm 23 cm 23 cm 15 cm below the elbow: 20.5 cm 20.5 cm 19.5 cm wrist (at the ulnar styloid): 16 cm 16 cm 16 cm Hand: 18.5 cm 18.5 cm 18.5 cm base of 2nd digit: 6 cm 6 cm 6 cm   Right: 15 cm above the elbow: 28 cm 28 cm 27.5 cm  10 cm above the elbow: 27.5 cm 27.5 cm 27 cm 10 cm below the elbow: 23 cm 23 cm 23.5 cm 15 cm below the elbow: 19.5 cm 20.5 cm 20.5 cm  wrist (at the ulnar styloid): 16 cm 16 cm 16.5 cm Hand: 19 cm 19.5 cm 20 cm base of 2nd digit: 6 cm 6.5 cm 6 cm

## 2025-04-15 NOTE — ASSESSMENT
[FreeTextEntry1] : 76 year old female presenting for evaluation.  #Post-mastectomy pain syndrome: -Likely secondary to bursitis  -Improving -S/P MRI -Continue home exercise program  -voltaren gel prn  -Monitor   #Chronic myofascial pain: -S/p PT and x-ray from PCP -Start tizanidine 2mg BID prn-rx sent -If no improvement, consider thoracic spine MRI   #Left breast edema: -s/p seroma aspiration -Early fibrosis/lymphedema -Continue self MLD -Continue chip pad/compression bra -Monitor  #At risk for lymphedema in b/l UE: -No clinical signs of lymphedema on exam, patient without subjective symptoms  -Lymphedema education provided to patient -Defer bioimpedance spectroscopy given b/l risk  -Continue to Monitor    Follow up in 2 months.

## 2025-04-15 NOTE — HISTORY OF PRESENT ILLNESS
[FreeTextEntry1] : Ms. 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  4/20/23 Surgical path: Left lumpectomy, SLNbx, IDC, Gr2, T1a (1.2mm), N0/8 Mx, DCIS, Gr3, no EIC, No LCIS, Margins neg for IDC >2mm and DCIS >2 mm. No LVI. Triple NEGATIVE on core. Pt s/p neoadj.  S/p Keytruda History of Right lumpectomy and ALND, chemotherapy and radiation in 1997 in [facility] at 50 y/o.  She reports that she does not have any swelling or heaviness in upper extremities.  Still denies any significant shoulder pain.  Reports that she has had pain for the past month or so in her left periscapular region.  Described as muscle tightness.  Status post x-ray with her primary care physician as well as physical therapy which was not helpful.  Denies any weakness or bowel bladder dysfunction.

## 2025-06-17 NOTE — ASSESSMENT
[FreeTextEntry1] : 76 year old female presenting for evaluation.  #Post-mastectomy pain syndrome: -Likely secondary to bursitis  -Improving -S/P MRI -Continue home exercise program  -voltaren gel prn  -Monitor   #Chronic myofascial pain: -S/p PT and x-ray from PCP -Start tizanidine 2mg BID prn-rx sent -Obtain MRI thoracic spine without and without contrast given radicular symptoms, she reports she can have MRI and contrast   #Left breast edema: -s/p seroma aspiration -Early fibrosis/lymphedema -Continue self MLD -Continue chip pad/compression bra -Monitor  #At risk for lymphedema in b/l UE: -No clinical signs of lymphedema on exam, patient without subjective symptoms  -Lymphedema education provided to patient -Defer bioimpedance spectroscopy given b/l risk  -Continue to Monitor    Follow up in 6 weeks.

## 2025-06-17 NOTE — HISTORY OF PRESENT ILLNESS
[FreeTextEntry1] : Ms. 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  4/20/23 Surgical path: Left lumpectomy, SLNbx, IDC, Gr2, T1a (1.2mm), N0/8 Mx, DCIS, Gr3, no EIC, No LCIS, Margins neg for IDC >2mm and DCIS >2 mm. No LVI. Triple NEGATIVE on core. Pt s/p neoadj.  S/p Keytruda History of Right lumpectomy and ALND, chemotherapy and radiation in 1997 in [facility] at 48 y/o.  Since her last visit she did not yet tried tizanidine.  She reports that she was waiting till she started her cholesterol medicine.  She reports that she still is feeling tightness in the left periscapular muscle muscle region.  Reports that she does feel some numbness and radiating pain in her left thoracic region.  No focal weakness or bowel bladder dysfunction.  No saddle anesthesia.  Reports some breast swelling but states this has been stable.  Returns reports shoulder pain has been doing well.  Good range of motion.

## 2025-06-17 NOTE — PHYSICAL EXAM
[FreeTextEntry1] : Gen: Patient is A&O x 3, NAD HEENT: EOMI, hearing grossly normal Resp: regular, non - labored CV: pulses regular Skin: no rashes, erythema Lymph: no clubbing, cyanosis, edema in UE, + subtle edema/fibrosis in left breast  Inspection: no instability  ROM: FROM left shoulder abduction  Palpation: TTP left periscapular muscles, No TTP thoracic paraspinals  Sensation: decreased to LT in b/l feet and left chest wall   Reflexes: 1+ and symmetric throughout Strength: 5/5 throughout Special tests: -Left allen sign, -Empty can sign, -spurling sign  Gait: normal, non-antalgic   Michelle-MA present for encounter as chaperone  Past Measurements upper extremities:  Left: 15 cm above the elbow: 29 cm 28 cm 28 cm 10 cm above the elbow: 28 cm 27.5 cm 27.5 cm 10 cm below the elbow: 23.5 cm 23 cm 23 cm 15 cm below the elbow: 20.5 cm 20.5 cm 19.5 cm wrist (at the ulnar styloid): 16 cm 16 cm 16 cm Hand: 18.5 cm 18.5 cm 18.5 cm base of 2nd digit: 6 cm 6 cm 6 cm   Right: 15 cm above the elbow: 28 cm 28 cm 27.5 cm  10 cm above the elbow: 27.5 cm 27.5 cm 27 cm 10 cm below the elbow: 23 cm 23 cm 23.5 cm 15 cm below the elbow: 19.5 cm 20.5 cm 20.5 cm  wrist (at the ulnar styloid): 16 cm 16 cm 16.5 cm Hand: 19 cm 19.5 cm 20 cm base of 2nd digit: 6 cm 6.5 cm 6 cm

## 2025-07-07 NOTE — REVIEW OF SYSTEMS
[As Noted in HPI] : as noted in HPI [Breast Lump] : breast lump [Negative] : Heme/Lymph [de-identified] : seroma palpable.

## 2025-07-07 NOTE — ASSESSMENT
[FreeTextEntry1] : Michelle Spaulding is a 76-year-old black female, CHAPIS negative, here today because she thinks her seroma has gotten larger and would like aspiration. 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 4/20/23 Surgical path: Left lumpectomy, SLNbx, IDC, Gr2, T1a (1.2mm), N0/8 Mx, DCIS, Gr3, no EIC, No LCIS, Margins neg for IDC >2mm and DCIS >2 mm. No LVI. Triple NEGATIVE on core. Pt s/p neoadj.  Left breast seroma aspirated last visit, 11/18 and cytology was negative. Previously aspirated also on 9/24 due to BR4a mmg on 8/24 and cytology was negative. At present, pt reports seroma has returned, desires reaspiration. Completed radiation 6/23 with Dr. Spivey. Pt finished chemo 4/16 per Dr. Rivera, finished Keytruda. PET/CT 2024 with seroma, per Dr. Rivera's last note in December. Seeing Dr. Tolentino for breast edema and arm pain, completed PT and rec to use voltarern gel. She saw him in December.  History [facility] R lumpect[facility] and ALND, [facility] 50 [facility] Pt unsure of receptors. Hx of provoked PE (4 days post Covid 19 vaccine #2 Pfizer,) on Xarelto since 2022.  11/1/22 [place], L breast and axillary lymph node bx. Path: *L breast 5:00 6cm fn, Gr3, ER 0%, PR0%, Her-2 neg-TRIPLE NEGATIVE, DCIS-Gr 2-3. No LVI. *L axillary LN yielded fragments of LN, negative for carcinoma. Rec surgical or oncological management. Results are Concordant.  1/23/24 PET/CT: Postsurgical changes L breast without significant activity. Mild nonfocal activity along left breast skin thickening. Continue f/u is rec Pt is s/p R breast lumpectomy with no abnormal activity in remaining breast. Bilat axilla negative. Increase in size of density in upper pole of R kidney. Correlate with dedicated contrast CT or MRI for further characterization.  8/29/24 NFR, bilat dMMG and L limited US: Het dense. 4 x 5 cm circumscribed posterior retroareolar/lumpectomy bed mass with adjacent surgical clips, developed from 10/9/2023. Radiation treatment changes, unchanged. No suspicious microcalcifications, or other sign of malignancy is identified. L limited US: Circumscribed 5.6 x 3 x 5.4 cm complex seroma or avascular mass corresponds to mammographic/palpable abnormality in the 5:00 N6 LEFT breast. Impression: 5.6 x 3 x 5.4 cm LEFT lumpectomy bed complex seroma or avascular mass is developed from 10/9/2023. Rec: Cyst aspiration. If determined to be solid at time of aspiration, core biopsy can be performed. BR4A.  9/5/24 [place], L 5:00 6N cyst aspiration: Negative for malignant cells. Impression: benign and concordant. Rec clinical f/u.  2/4/2025 [place] L dMMG and limited US: The breasts are HG dense. No suspicious mass, microcalcs, or other sign of malignancy is identified. A postsurgical seroma is again identified at the area of postsurgical scar and surgical clips in the *posterior lateral left breast. A few benign-appearing calcifications are again noted. Skin thickening secondary to radiation treatment changes is unchanged. US: Again, identified at *5:00, 6cm FN is a 5.1 x 3.0 x 4.7 cm seroma, which had been previously aspirated with benign results, but has now returned. There is no suspicious solid mass. IMPRESSION: Return of previously aspirated benign seroma. REC: Clinical follow up. Unless otherwise clinically indicated, annual bilateral mammogram would be due in August 2025. BR2.  3/7/25 MRI breast, Left with 5.5 cm seroma.  Bilat no evidence of malignancy, BR2.   FHx: Breast cancer: MAunt 1/2  CBE: Right Well healed prior axillary scar and UIQ scar. Left inframammary inc and Axillary inc healed well with post radiation and postop changes. Seroma is palpable but not clear where aspiration would be optimal.  No axillary or SC lymphadenopathy. No obvious lymphedema.  Pt feels the seroma has increased and would like aspiration. Would rec under u/s guidance given minimal return last time. Seroma was seen on 2/25 u/s and 3/25 MRI.  F/u with Dr. Rivera and Dr. Tolentino as scheduled. No SOZO given bilat.  PLAN: Pt is over 2.0 out. ARELI. F.u with Dr. Rivera and Patric as scheduled. Aspiration of left seroma. F.u with Dr. Tolentino as scheduled. Bilat mmg and u/s due 8/30/25. F/u after mmg and u/s if nothing suspicious with aspiration.

## 2025-07-07 NOTE — DATA REVIEWED
[FreeTextEntry1] : 2/4/2025 [place] L dMMG and limited US: The breasts are HG dense. No suspicious mass, microcalcs, or other sign of malignancy is identified. A postsurgical seroma is again identified at the area of postsurgical scar and surgical clips in the *posterior lateral left breast. A few benign-appearing calcifications are again noted. Skin thickening secondary to radiation treatment changes is unchanged. US: Again, identified at *5:00, 6cm FN is a 5.1 x 3.0 x 4.7 cm seroma, which had been previously aspirated with benign results, but has now returned. There is no suspicious solid mass. IMPRESSION: Return of previously aspirated benign seroma. REC: Clinical follow up. Unless otherwise clinically indicated, annual bilateral mammogram would be due in August 2025. BR2.  3/7/25 MRI breast, Left with 5.5 cm seroma. Bilat no evidence of malignancy, BR2.

## 2025-07-07 NOTE — HISTORY OF PRESENT ILLNESS
[FreeTextEntry1] : Michelle Spaulding is a 76-year-old black female, CHAPIS negative, here today because she thinks her seroma has gotten larger and would like aspiration. 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 4/20/23 Surgical path: Left lumpectomy, SLNbx, IDC, Gr2, T1a (1.2mm), N0/8 Mx, DCIS, Gr3, no EIC, No LCIS, Margins neg for IDC >2mm and DCIS >2 mm. No LVI. Triple NEGATIVE on core. Pt s/p neoadj.  Left breast seroma aspirated last visit, 11/18 and cytology was negative. Previously aspirated also on 9/24 due to BR4a mmg on 8/24 and cytology was negative. At present, pt reports seroma has returned, desires reaspiration. Completed radiation 6/23 with Dr. Spivey. Pt finished chemo 4/16 per Dr. Rivera, finished Keytruda. PET/CT 2024 with seroma, per Dr. Rivera's last note in December. Seeing Dr. Tolentino for breast edema and arm pain, completed PT and rec to use voltarern gel. She saw him in December.  History of R lumpectomy and ALND, [facility] 48 [facility] Pt unsure of receptors. Hx of provoked PE (4 days post Covid 19 vaccine #2 Pfizer,) on Xarelto since 2022.  11/1/22 [place], L breast and axillary lymph node bx. Path: *L breast 5:00 6cm fn, Gr3, ER 0%, PR0%, Her-2 neg-TRIPLE NEGATIVE, DCIS-Gr 2-3. No LVI. *L axillary LN yielded fragments of LN, negative for carcinoma. Rec surgical or oncological management. Results are Concordant.  1/23/24 PET/CT: Postsurgical changes L breast without significant activity. Mild nonfocal activity along left breast skin thickening. Continue f/u is rec Pt is s/p R breast lumpectomy with no abnormal activity in remaining breast. Bilat axilla negative. Increase in size of density in upper pole of R kidney. Correlate with dedicated contrast CT or MRI for further characterization.  8/29/24 NFR, bilat dMMG and L limited US: Het dense. 4 x 5 cm circumscribed posterior retroareolar/lumpectomy bed mass with adjacent surgical clips, developed from 10/9/2023. Radiation treatment changes, unchanged. No suspicious microcalcifications, or other sign of malignancy is identified. L limited US: Circumscribed 5.6 x 3 x 5.4 cm complex seroma or avascular mass corresponds to mammographic/palpable abnormality in the 5:00 N6 LEFT breast. Impression: 5.6 x 3 x 5.4 cm LEFT lumpectomy bed complex seroma or avascular mass is developed from 10/9/2023. Rec: Cyst aspiration. If determined to be solid at time of aspiration, core biopsy can be performed. BR4A.  9/5/24 [place], L 5:00 6N cyst aspiration: Negative for malignant cells. Impression: benign and concordant. Rec clinical f/u.  2/4/2025 [place] L dMMG and limited US: The breasts are HG dense. No suspicious mass, microcalcs, or other sign of malignancy is identified. A postsurgical seroma is again identified at the area of postsurgical scar and surgical clips in the *posterior lateral left breast. A few benign-appearing calcifications are again noted. Skin thickening secondary to radiation treatment changes is unchanged. US: Again, identified at *5:00, 6cm FN is a 5.1 x 3.0 x 4.7 cm seroma, which had been previously aspirated with benign results, but has now returned. There is no suspicious solid mass. IMPRESSION: Return of previously aspirated benign seroma. REC: Clinical follow up. Unless otherwise clinically indicated, annual bilateral mammogram would be due in August 2025. BR2.  3/7/25 MRI breast, Left with 5.5 cm seroma.  Bilat no evidence of malignancy, BR2.   FHx: Breast cancer: MAunt 1/2  CBE: Right Well healed prior axillary scar and UIQ scar. Left inframammary inc and Axillary inc healed well with post radiation and postop changes. Seroma is palpable but not clear where aspiration would be optimal.  No axillary or SC lymphadenopathy. No obvious lymphedema.  Pt feels the seroma has increased and would like aspiration. Would rec under u/s guidance.   F/u with Dr. Rivera and Dr. Tolentino as scheduled. No SOZO given bilat.  PLAN: Pt is over 2.0 out. ARELI. F.u with Dr. Rivera and Patric as scheduled. Aspiration of left seroma pending results of MRI. F.u with Dr. Tolentino as scheduled. Bilat mmg and u/s due 8/30/25. F/u after mmg and u/s if nothing suspicious with aspiration.

## 2025-07-07 NOTE — PHYSICAL EXAM
[Normocephalic] : normocephalic [Atraumatic] : atraumatic [Supple] : supple [No Supraclavicular Adenopathy] : no supraclavicular adenopathy [No Thyromegaly] : no thyromegaly [Examined in the supine and seated position] : examined in the supine and seated position [No dominant masses] : no dominant masses in right breast  [No dominant masses] : no dominant masses left breast [No Nipple Retraction] : no left nipple retraction [No Nipple Discharge] : no left nipple discharge [No Axillary Lymphadenopathy] : no left axillary lymphadenopathy [No Edema] : no edema [No Swelling] : no swelling [Full ROM] : full range of motion [No Rashes] : no rashes [No Ulceration] : no ulceration [de-identified] : ight Well healed prior axillary scar and UIQ scar. Left inframammary inc and Axillary inc healed well with post radiation and postop changes. Seroma is palpable but not clear where aspiration puncture site would be optimal.  [TextEntry] : Psych: Appropriate, NAD, A&Ox3 Neuro: Intact grossly, gait normal

## 2025-07-07 NOTE — CONSULT LETTER
[Dear  ___] : Dear  [unfilled], [Consult Letter:] : I had the pleasure of evaluating your patient, [unfilled]. [Please see my note below.] : Please see my note below. [Consult Closing:] : Thank you very much for allowing me to participate in the care of this patient.  If you have any questions, please do not hesitate to contact me. [Sincerely,] : Sincerely, [FreeTextEntry3] : Alva Martinez MD

## 2025-07-07 NOTE — REVIEW OF SYSTEMS
[As Noted in HPI] : as noted in HPI [Breast Lump] : breast lump [Negative] : Heme/Lymph [de-identified] : seroma palpable.

## 2025-07-07 NOTE — HISTORY OF PRESENT ILLNESS
[FreeTextEntry1] : Michelle Spaulding is a 76-year-old black female, CHAPIS negative, here today because she thinks her seroma has gotten larger and would like aspiration. 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 4/20/23 Surgical path: Left lumpectomy, SLNbx, IDC, Gr2, T1a (1.2mm), N0/8 Mx, DCIS, Gr3, no EIC, No LCIS, Margins neg for IDC >2mm and DCIS >2 mm. No LVI. Triple NEGATIVE on core. Pt s/p neoadj.  Left breast seroma aspirated last visit, 11/18 and cytology was negative. Previously aspirated also on 9/24 due to BR4a mmg on 8/24 and cytology was negative. At present, pt reports seroma has returned, desires reaspiration. Completed radiation 6/23 with Dr. Spivey. Pt finished chemo 4/16 per Dr. Rivera, finished Keytruda. PET/CT 2024 with seroma, per Dr. Rivera's last note in December. Seeing Dr. Tolentino for breast edema and arm pain, completed PT and rec to use voltarern gel. She saw him in December.  History of R lumpectomy and ALND, chemotherapy and radiation in 1997 in [facility] at 48 y/o. Pt unsure of receptors. Hx of provoked PE (4 days post Covid 19 vaccine #2 Pfizer,) on Xarelto since 2022.  11/1/22 [place], L breast and axillary lymph node bx. Path: *L breast 5:00 6cm fn, Gr3, ER 0%, PR0%, Her-2 neg-TRIPLE NEGATIVE, DCIS-Gr 2-3. No LVI. *L axillary LN yielded fragments of LN, negative for carcinoma. Rec surgical or oncological management. Results are Concordant.  1/23/24 PET/CT: Postsurgical changes L breast without significant activity. Mild nonfocal activity along left breast skin thickening. Continue f/u is rec Pt is s/p R breast lumpectomy with no abnormal activity in remaining breast. Bilat axilla negative. Increase in size of density in upper pole of R kidney. Correlate with dedicated contrast CT or MRI for further characterization.  8/29/24 NFR, bilat dMMG and L limited US: Het dense. 4 x 5 cm circumscribed posterior retroareolar/lumpectomy bed mass with adjacent surgical clips, developed from 10/9/2023. Radiation treatment changes, unchanged. No suspicious microcalcifications, or other sign of malignancy is identified. L limited US: Circumscribed 5.6 x 3 x 5.4 cm complex seroma or avascular mass corresponds to mammographic/palpable abnormality in the 5:00 N6 LEFT breast. Impression: 5.6 x 3 x 5.4 cm LEFT lumpectomy bed complex seroma or avascular mass is developed from 10/9/2023. Rec: Cyst aspiration. If determined to be solid at time of aspiration, core biopsy can be performed. BR4A.  9/5/24 [place], L 5:00 6N cyst aspiration: Negative for malignant cells. Impression: benign and concordant. Rec clinical f/u.  2/4/2025 [place] L dMMG and limited US: The breasts are HG dense. No suspicious mass, microcalcs, or other sign of malignancy is identified. A postsurgical seroma is again identified at the area of postsurgical scar and surgical clips in the *posterior lateral left breast. A few benign-appearing calcifications are again noted. Skin thickening secondary to radiation treatment changes is unchanged. US: Again, identified at *5:00, 6cm FN is a 5.1 x 3.0 x 4.7 cm seroma, which had been previously aspirated with benign results, but has now returned. There is no suspicious solid mass. IMPRESSION: Return of previously aspirated benign seroma. REC: Clinical follow up. Unless otherwise clinically indicated, annual bilateral mammogram would be due in August 2025. BR2.  3/7/25 MRI breast, Left with 5.5 cm seroma.  Bilat no evidence of malignancy, BR2.   FHx: Breast cancer: MAunt 1/2  CBE: Right Well healed prior axillary scar and UIQ scar. Left inframammary inc and Axillary inc healed well with post radiation and postop changes. Seroma is palpable but not clear where aspiration would be optimal.  No axillary or SC lymphadenopathy. No obvious lymphedema.  Pt feels the seroma has increased and would like aspiration. Would rec under u/s guidance.   F/u with Dr. Rivera and Dr. Toletnino as scheduled. No SOZO given bilat.  PLAN: Pt is over 2.0 out. ARELI. F.u with Dr. Rivera and Patric as scheduled. Aspiration of left seroma pending results of MRI. F.u with Dr. Tolentino as scheduled. Bilat mmg and u/s due 8/30/25. F/u after mmg and u/s if nothing suspicious with aspiration.

## 2025-07-07 NOTE — ASSESSMENT
[FreeTextEntry1] : Michelle Spaulding is a 76-year-old black female, CHAPIS negative, here today because she thinks her seroma has gotten larger and would like aspiration. 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 4/20/23 Surgical path: Left lumpectomy, SLNbx, IDC, Gr2, T1a (1.2mm), N0/8 Mx, DCIS, Gr3, no EIC, No LCIS, Margins neg for IDC >2mm and DCIS >2 mm. No LVI. Triple NEGATIVE on core. Pt s/p neoadj.  Left breast seroma aspirated last visit, 11/18 and cytology was negative. Previously aspirated also on 9/24 due to BR4a mmg on 8/24 and cytology was negative. At present, pt reports seroma has returned, desires reaspiration. Completed radiation 6/23 with Dr. Spivey. Pt finished chemo 4/16 per Dr. Rivera, finished Keytruda. PET/CT 2024 with seroma, per Dr. Rivera's last note in December. Seeing Dr. Tolentino for breast edema and arm pain, completed PT and rec to use voltarern gel. She saw him in December.  History of R lumpectomy and ALND, chemotherapy and radiation in 1997 in [facility] at 48 y/o. Pt unsure of receptors. Hx of provoked PE (4 days post Covid 19 vaccine #2 Pfizer,) on Xarelto since 2022.  11/1/22 [place], L breast and axillary lymph node bx. Path: *L breast 5:00 6cm fn, Gr3, ER 0%, PR0%, Her-2 neg-TRIPLE NEGATIVE, DCIS-Gr 2-3. No LVI. *L axillary LN yielded fragments of LN, negative for carcinoma. Rec surgical or oncological management. Results are Concordant.  1/23/24 PET/CT: Postsurgical changes L breast without significant activity. Mild nonfocal activity along left breast skin thickening. Continue f/u is rec Pt is s/p R breast lumpectomy with no abnormal activity in remaining breast. Bilat axilla negative. Increase in size of density in upper pole of R kidney. Correlate with dedicated contrast CT or MRI for further characterization.  8/29/24 NFR, bilat dMMG and L limited US: Het dense. 4 x 5 cm circumscribed posterior retroareolar/lumpectomy bed mass with adjacent surgical clips, developed from 10/9/2023. Radiation treatment changes, unchanged. No suspicious microcalcifications, or other sign of malignancy is identified. L limited US: Circumscribed 5.6 x 3 x 5.4 cm complex seroma or avascular mass corresponds to mammographic/palpable abnormality in the 5:00 N6 LEFT breast. Impression: 5.6 x 3 x 5.4 cm LEFT lumpectomy bed complex seroma or avascular mass is developed from 10/9/2023. Rec: Cyst aspiration. If determined to be solid at time of aspiration, core biopsy can be performed. BR4A.  9/5/24 [place], L 5:00 6N cyst aspiration: Negative for malignant cells. Impression: benign and concordant. Rec clinical f/u.  2/4/2025 [facility] [place] L dMMG and limited US: The breasts are HG dense. No suspicious mass, microcalcs, or other sign of malignancy is identified. A postsurgical seroma is again identified at the area of postsurgical scar and surgical clips in the *posterior lateral left breast. A few benign-appearing calcifications are again noted. Skin thickening secondary to radiation treatment changes is unchanged. US: Again, identified at *5:00, 6cm FN is a 5.1 x 3.0 x 4.7 cm seroma, which had been previously aspirated with benign results, but has now returned. There is no suspicious solid mass. IMPRESSION: Return of previously aspirated benign seroma. REC: Clinical follow up. Unless otherwise clinically indicated, annual bilateral mammogram would be due in August 2025. BR2.  3/7/25 MRI breast, Left with 5.5 cm seroma.  Bilat no evidence of malignancy, BR2.   FHx: Breast cancer: MAunt 1/2  CBE: Right Well healed prior axillary scar and UIQ scar. Left inframammary inc and Axillary inc healed well with post radiation and postop changes. Seroma is palpable but not clear where aspiration would be optimal.  No axillary or SC lymphadenopathy. No obvious lymphedema.  Pt feels the seroma has increased and would like aspiration. Would rec under u/s guidance given minimal return last time. Seroma was seen on 2/25 u/s and 3/25 MRI.  F/u with Dr. Rivera and Dr. Tolentino as scheduled. No SOZO given bilat.  PLAN: Pt is over 2.0 out. ARELI. F.u with Dr. Rivera and Patric as scheduled. Aspiration of left seroma. F.u with Dr. Tolentino as scheduled. Bilat mmg and u/s due 8/30/25. F/u after mmg and u/s if nothing suspicious with aspiration.

## 2025-07-07 NOTE — DATA REVIEWED
[FreeTextEntry1] : 2/4/2025 [facility] [place] L dMMG and limited US: The breasts are HG dense. No suspicious mass, microcalcs, or other sign of malignancy is identified. A postsurgical seroma is again identified at the area of postsurgical scar and surgical clips in the *posterior lateral left breast. A few benign-appearing calcifications are again noted. Skin thickening secondary to radiation treatment changes is unchanged. US: Again, identified at *5:00, 6cm FN is a 5.1 x 3.0 x 4.7 cm seroma, which had been previously aspirated with benign results, but has now returned. There is no suspicious solid mass. IMPRESSION: Return of previously aspirated benign seroma. REC: Clinical follow up. Unless otherwise clinically indicated, annual bilateral mammogram would be due in August 2025. BR2.  3/7/25 MRI breast, Left with 5.5 cm seroma. Bilat no evidence of malignancy, BR2.

## 2025-07-07 NOTE — PHYSICAL EXAM
[Normocephalic] : normocephalic [Atraumatic] : atraumatic [Supple] : supple [No Supraclavicular Adenopathy] : no supraclavicular adenopathy [No Thyromegaly] : no thyromegaly [Examined in the supine and seated position] : examined in the supine and seated position [No dominant masses] : no dominant masses in right breast  [No dominant masses] : no dominant masses left breast [No Nipple Retraction] : no left nipple retraction [No Nipple Discharge] : no left nipple discharge [No Axillary Lymphadenopathy] : no left axillary lymphadenopathy [No Edema] : no edema [No Swelling] : no swelling [Full ROM] : full range of motion [No Rashes] : no rashes [No Ulceration] : no ulceration [de-identified] : ight Well healed prior axillary scar and UIQ scar. Left inframammary inc and Axillary inc healed well with post radiation and postop changes. Seroma is palpable but not clear where aspiration puncture site would be optimal.  [TextEntry] : Psych: Appropriate, NAD, A&Ox3 Neuro: Intact grossly, gait normal